# Patient Record
Sex: FEMALE | Race: WHITE | NOT HISPANIC OR LATINO | Employment: STUDENT | URBAN - METROPOLITAN AREA
[De-identification: names, ages, dates, MRNs, and addresses within clinical notes are randomized per-mention and may not be internally consistent; named-entity substitution may affect disease eponyms.]

---
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
## 2023-05-26 DIAGNOSIS — Z30.41 SURVEILLANCE FOR BIRTH CONTROL, ORAL CONTRACEPTIVES: ICD-10-CM

## 2023-05-31 NOTE — TELEPHONE ENCOUNTER
Pt has a WA scheduled for 7/28/23 and is requesting to have refills for her birth control to cover until seen   Please review for refill request

## 2023-06-01 RX ORDER — LEVONORGESTREL AND ETHINYL ESTRADIOL 0.1-0.02MG
KIT ORAL
Qty: 84 TABLET | Refills: 0 | Status: SHIPPED | OUTPATIENT
Start: 2023-06-01

## 2023-08-18 ENCOUNTER — OFFICE VISIT (OUTPATIENT)
Dept: OBGYN CLINIC | Facility: CLINIC | Age: 18
End: 2023-08-18
Payer: COMMERCIAL

## 2023-08-18 VITALS
BODY MASS INDEX: 19.53 KG/M2 | DIASTOLIC BLOOD PRESSURE: 70 MMHG | HEIGHT: 67 IN | SYSTOLIC BLOOD PRESSURE: 112 MMHG | WEIGHT: 124.4 LBS

## 2023-08-18 DIAGNOSIS — Z11.3 SCREEN FOR STD (SEXUALLY TRANSMITTED DISEASE): ICD-10-CM

## 2023-08-18 DIAGNOSIS — L40.9 PSORIASIS: ICD-10-CM

## 2023-08-18 DIAGNOSIS — N92.3 IMB (INTERMENSTRUAL BLEEDING): Primary | ICD-10-CM

## 2023-08-18 PROCEDURE — 99213 OFFICE O/P EST LOW 20 MIN: CPT | Performed by: OBSTETRICS & GYNECOLOGY

## 2023-08-18 RX ORDER — RISANKIZUMAB-RZAA 150 MG/ML
INJECTION SUBCUTANEOUS
COMMUNITY
Start: 2023-07-07

## 2023-08-18 RX ORDER — NORGESTREL-ETHINYL ESTRADIOL 0.3-0.03MG
1 TABLET ORAL DAILY
Qty: 90 TABLET | Refills: 3 | Status: SHIPPED | OUTPATIENT
Start: 2023-08-18 | End: 2023-08-25 | Stop reason: SDUPTHER

## 2023-08-18 NOTE — PROGRESS NOTES
PROBLEM GYNECOLOGICAL VISIT    Lauro Manzo is a 21 y.o. female who presents today with complaint of IMB. Her general medical history has been reviewed and she reports it as follows:    Past Medical History:   Diagnosis Date   • Anxiety    • Psoriasis      Past Surgical History:   Procedure Laterality Date   • CAUTERIZE INNER NOSE     • OTHER SURGICAL HISTORY  2020    frenectomy     OB History        0    Para   0    Term   0       0    AB   0    Living   0       SAB   0    IAB   0    Ectopic   0    Multiple   0    Live Births   0               Social History     Tobacco Use   • Smoking status: Never   • Smokeless tobacco: Never   Vaping Use   • Vaping Use: Never used   Substance Use Topics   • Alcohol use: Yes     Alcohol/week: 1.0 - 4.0 standard drink of alcohol     Types: 1 - 4 Standard drinks or equivalent per week   • Drug use: Never       Current Outpatient Medications   Medication Instructions   • levonorgestrel-ethinyl estradiol (AVIANE,ALESSE,LESSINA) 0.1-20 MG-MCG per tablet take 1 tablet by mouth once daily   • Stelara 45 MG/0.5ML SOSY subcutaneous injection No dose, route, or frequency recorded. • venlafaxine 75 mg, Oral, Daily, take with food       History of Present Illness:   + has  Had  BTB     Day  7 to 9 around for   4-5  Mo. Full bleeding.  + sexually active . No missed pills   No chg  In   Wt  + lab work w primary w  Anemia      Review of Systems:  Review of Systems   Constitutional: Negative. Gastrointestinal: Negative. Genitourinary: Positive for menstrual problem and vaginal bleeding. Neurological: Negative. Psychiatric/Behavioral: Negative. Physical Exam:  There were no vitals taken for this visit. Physical Exam  Constitutional:       Appearance: Normal appearance. Genitourinary:      Bladder, rectum and urethral meatus normal.      Right Labia: No rash, tenderness, lesions or skin changes.      Left Labia: No tenderness, lesions, skin changes or rash. No labial fusion noted. No inguinal adenopathy present in the right or left side. Pelvic Jn Score: 5/5. Vaginal cuff intact. No vaginal prolapse present. No vaginal atrophy present. Right Adnexa: not tender, not full and no mass present. Left Adnexa: not tender, not full and no mass present. Cervix is not nulliparous. No cervical motion tenderness, discharge, friability or lesion. Uterus is not enlarged, tender or prolapsed. No urethral prolapse, tenderness or discharge present. Pelvic Floor: Levator muscle strength is 4/5. Pelvic exam was performed with patient in the lithotomy position. Abdominal:      General: Abdomen is flat. Bowel sounds are normal.      Palpations: Abdomen is soft. Hernia: There is no hernia in the left inguinal area or right inguinal area. Lymphadenopathy:      Lower Body: No right inguinal adenopathy. No left inguinal adenopathy. Neurological:      Mental Status: She is alert. Vitals and nursing note reviewed. I have spent a total time of 22 minutes on 08/18/23 in caring for this patient including Diagnostic results, Instructions for management, Importance of tx compliance, Risk factor reductions, Counseling / Coordination of care, Documenting in the medical record and Reviewing / ordering tests, medicine, procedures  . Assessment:   1. IMB,  No missed pills,  Screen for  Std     Plan:    Dw pt  Iron rich diet,  gc /chlm done encouraged  Encouraged condom use. MVI   Daily  . Day one of  Cycle   cryseele one po every day   [place] btb days. Fu in spring or  Prn  Reviewed with patient that test results are available in [facility] immediately, but that they will not necessarily be reviewed by me immediately. Explained that I will review results at my earliest opportunity and contact patient appropriately.

## 2023-08-18 NOTE — PATIENT INSTRUCTIONS
Take birth control as directed. Start day one of period one tablet daily. [place]  BTB  days  as explained. Rx sent to pharmacy on file. ACHES reviewed. Aware of benefits, risks and alternatives of birth control. Exercise 150 minutes per week minimum. Always condom use for STI protection.

## 2023-08-19 LAB
C TRACH RRNA SPEC QL NAA+PROBE: DETECTED
N GONORRHOEA RRNA SPEC QL NAA+PROBE: NOT DETECTED

## 2023-08-21 ENCOUNTER — TELEPHONE (OUTPATIENT)
Dept: OBGYN CLINIC | Facility: CLINIC | Age: 18
End: 2023-08-21

## 2023-08-21 DIAGNOSIS — Z11.3 SCREEN FOR STD (SEXUALLY TRANSMITTED DISEASE): ICD-10-CM

## 2023-08-21 DIAGNOSIS — A74.9 CHLAMYDIA INFECTION: Primary | ICD-10-CM

## 2023-08-21 RX ORDER — DOXYCYCLINE 100 MG/1
100 TABLET ORAL 2 TIMES DAILY
Qty: 14 TABLET | Refills: 0 | Status: SHIPPED | OUTPATIENT
Start: 2023-08-21 | End: 2023-08-28

## 2023-08-21 NOTE — TELEPHONE ENCOUNTER
Spoke with [address]te [address] and informed pt is not pregnant & is being treated with Doxycycline 100 mg 1 pill 2x/day for a total of 7 days. Informed Amarjit Redman has not been informed of results yet. Provided Rohini with pts cell phone #..

## 2023-08-21 NOTE — TELEPHONE ENCOUNTER
Danielle, [facility] reports pt tested (+)for Chlamydia. She is requesting treatment & pregnancy status of pt. .  Message sent to LEXI Zamarripa for test results. Rohini's phone # & confidential voice mail is [phone].

## 2023-08-25 ENCOUNTER — ANNUAL EXAM (OUTPATIENT)
Dept: OBGYN CLINIC | Facility: CLINIC | Age: 18
End: 2023-08-25
Payer: COMMERCIAL

## 2023-08-25 VITALS
HEIGHT: 67 IN | BODY MASS INDEX: 19.62 KG/M2 | WEIGHT: 125 LBS | SYSTOLIC BLOOD PRESSURE: 112 MMHG | DIASTOLIC BLOOD PRESSURE: 72 MMHG

## 2023-08-25 DIAGNOSIS — L40.9 PSORIASIS: ICD-10-CM

## 2023-08-25 DIAGNOSIS — Z86.19 HISTORY OF CHLAMYDIA INFECTION: ICD-10-CM

## 2023-08-25 DIAGNOSIS — Z01.419 ENCOUNTER FOR GYNECOLOGICAL EXAMINATION WITHOUT ABNORMAL FINDING: Primary | ICD-10-CM

## 2023-08-25 DIAGNOSIS — Z11.3 SCREEN FOR STD (SEXUALLY TRANSMITTED DISEASE): ICD-10-CM

## 2023-08-25 DIAGNOSIS — Z30.41 SURVEILLANCE FOR BIRTH CONTROL, ORAL CONTRACEPTIVES: ICD-10-CM

## 2023-08-25 PROCEDURE — 99395 PREV VISIT EST AGE 18-39: CPT | Performed by: OBSTETRICS & GYNECOLOGY

## 2023-08-25 RX ORDER — NORGESTREL-ETHINYL ESTRADIOL 0.3-0.03MG
1 TABLET ORAL DAILY
Qty: 90 TABLET | Refills: 3 | Status: SHIPPED | OUTPATIENT
Start: 2023-08-25 | End: 2024-08-24

## 2023-08-25 NOTE — PROGRESS NOTES
[address] [place] [address], [address], [place], [address] [facility]    ASSESSMENT/PLAN: Omar Bui is a 21 y.o. [facility] who presents for annual gynecologic exam.    Encounter for routine gynecologic examination  - Routine well woman exam completed today. - HPV Vaccination status: Immunization series complete  - STI screening offered including HIV testing: orders  Given  counseled  On   brittany   - Contraceptive counseling discussed. Current contraception: condoms or combination OCPs:     Additional problems addressed during this visit:  1. Encounter for gynecological examination without abnormal finding    2. Psoriasis    3. Surveillance for birth control, oral contraceptives    4. Screen for STD (sexually transmitted disease)    5. History of chlamydia infection    22 yo  here for wellness exam.  + treated on  for  chlamydia . Partner was treated. Dw pt  Treat to cure  In 2023. Counseling done on STD prevention. On OCP no missed  Pills will  Monitor  IMB. Counseled on STI testing  Lab slip given. Pelvic deferred today done 2 weeks ago . No dysuria or frequency. CC:  Annual Gynecologic Examination    HPI: Omar Bui is a 21 y.o. [facility] who presents for annual gynecologic examination. 22 yo  here for wellness exam.  On  Pil w no BTB, denies ACHES  . No missed pills     + tx for chlmydia       The following portions of the patient's history were reviewed and updated as appropriate: She  has a past medical history of Anxiety and Psoriasis. She  has a past surgical history that includes Cauterize inner nose and Other surgical history (2020). Her family history includes Cancer in her maternal grandmother; Coronary artery disease in her mother; Diabetes in her father; Hyperlipidemia in her mother; Hypertension in her mother. She  reports that she has never smoked. She has never been exposed to tobacco smoke.  She has never used smokeless tobacco. She reports current alcohol use of about 1.0 - 4.0 standard drink of alcohol per week. She reports that she does not use drugs. Current Outpatient Medications   Medication Sig Dispense Refill   • doxycycline (ADOXA) 100 MG tablet Take 1 tablet (100 mg total) by mouth 2 (two) times a day for 7 days 14 tablet 0   • norgestrel-ethinyl estradiol (Cryselle-28) 0.3 mg-30 mcg per tablet Take 1 tablet by mouth daily 90 tablet 3   • Skyrizi Pen 150 MG/ML SOAJ      • venlafaxine 75 mg 24 hr tablet Take 75 mg by mouth daily take with food     • Stelara 45 MG/0.5ML SOSY subcutaneous injection  (Patient not taking: Reported on 8/18/2023)       No current facility-administered medications for this visit. She has No Known Allergies. .    Review of Systems   Constitutional: Negative for chills and fever. HENT: Negative for ear pain and sore throat. Eyes: Negative for pain and visual disturbance. Respiratory: Negative for cough and shortness of breath. Cardiovascular: Negative for chest pain and palpitations. Gastrointestinal: Negative for abdominal pain and vomiting. Genitourinary: Negative for dysuria and hematuria. Musculoskeletal: Negative for arthralgias and back pain. Skin: Negative for color change and rash. Neurological: Negative for seizures and syncope. Hematological: Negative. Psychiatric/Behavioral: Negative. All other systems reviewed and are negative. Objective:  /72 (BP Location: Left arm, Patient Position: Sitting, Cuff Size: Standard)   Ht 5' 7" (1.702 m)   Wt 56.7 kg (125 lb)   LMP 08/23/2023 (Approximate)   BMI 19.58 kg/m²    Physical Exam  Vitals and nursing note reviewed. Constitutional:       Appearance: Normal appearance. HENT:      Head: Normocephalic. Cardiovascular:      Rate and Rhythm: Normal rate and regular rhythm. Pulses: Normal pulses. Heart sounds: Normal heart sounds.    Pulmonary:      Effort: Pulmonary effort is normal.      Breath sounds: Normal breath sounds. Chest:      Chest wall: No mass, lacerations, swelling, tenderness or edema. Breasts: Jn Score is 4. Breasts are symmetrical.      Right: Normal. No swelling, bleeding, inverted nipple, mass, nipple discharge, skin change or tenderness. Left: No swelling, bleeding, inverted nipple, mass, nipple discharge, skin change or tenderness. Abdominal:      General: Abdomen is flat. Bowel sounds are normal.      Palpations: Abdomen is soft. Genitourinary:     Jn stage (genital): 4. Comments: Deferred pt w  Exam  On  8/18    Musculoskeletal:         General: Normal range of motion. Cervical back: Neck supple. Lymphadenopathy:      Upper Body:      Right upper body: No supraclavicular, axillary or pectoral adenopathy. Left upper body: No supraclavicular, axillary or pectoral adenopathy. Skin:     General: Skin is warm and dry. Neurological:      General: No focal deficit present. Mental Status: She is alert and oriented to person, place, and time. Psychiatric:         Mood and Affect: Mood normal.         Behavior: Behavior normal.         Thought Content:  Thought content normal.         Judgment: Judgment normal.

## 2023-08-25 NOTE — PATIENT INSTRUCTIONS
Pap every 3 years if normal, starting at age 24  STI testing as indicated, exercise most days of week, obtain appropriate diet and hydration, Calcium 1000mg + 600 vit D daily, birth control as directed (ACHES reviewed). Benefits, risks and alternatives discussed/reviewed. Condom use when sexually active for sexually transmitted infection prevention. HPV 9 vaccine recommended through age 39. Check with your insurance for coverage. If covered, call office to schedule start of vaccine series. Annual mammogram starting at age 36, monthly breast self exam. Kuhrram Munoz   at red light or at least  20 times a day.

## 2023-10-23 LAB
HAV IGM SERPL QL IA: NORMAL
HBV CORE IGM SERPL QL IA: NORMAL
HBV SURFACE AG SERPL QL IA: NORMAL
HCV AB SERPL QL IA: NORMAL
HIV 1+2 AB+HIV1 P24 AG SERPL QL IA: NORMAL
RPR SER QL: NORMAL

## 2023-10-24 PROBLEM — Z11.3 SCREEN FOR STD (SEXUALLY TRANSMITTED DISEASE): Status: RESOLVED | Noted: 2023-08-25 | Resolved: 2023-10-24

## 2023-10-24 PROBLEM — Z01.419 ENCOUNTER FOR GYNECOLOGICAL EXAMINATION WITHOUT ABNORMAL FINDING: Status: RESOLVED | Noted: 2023-08-25 | Resolved: 2023-10-24

## 2023-11-02 ENCOUNTER — TELEPHONE (OUTPATIENT)
Dept: OBGYN CLINIC | Facility: CLINIC | Age: 18
End: 2023-11-02

## 2023-11-02 NOTE — TELEPHONE ENCOUNTER
Pt called to request to have her RX for her birth control sent to [facility]. A one year renewal was provided at her 8/25/23 appointment and sent to [facility]. This [facility] has closed and informed all prescriptions were sent to [facility]. Pt is currently attending college in [place], [place]. Pt will contact the [facility] in [place] and request to have her prescription transferred to either [facility] or [facility] in [place], [place]. Instructed pt to call back if she needs any further assistance.

## 2023-11-07 ENCOUNTER — TELEPHONE (OUTPATIENT)
Dept: OBGYN CLINIC | Facility: CLINIC | Age: 18
End: 2023-11-07

## 2023-11-07 DIAGNOSIS — Z11.3 SCREEN FOR STD (SEXUALLY TRANSMITTED DISEASE): ICD-10-CM

## 2023-11-07 DIAGNOSIS — Z30.41 SURVEILLANCE FOR BIRTH CONTROL, ORAL CONTRACEPTIVES: Primary | ICD-10-CM

## 2023-11-07 DIAGNOSIS — L40.9 PSORIASIS: ICD-10-CM

## 2023-11-07 RX ORDER — NORGESTREL-ETHINYL ESTRADIOL 0.3-0.03MG
1 TABLET ORAL DAILY
Qty: 90 TABLET | Refills: 3 | Status: SHIPPED | OUTPATIENT
Start: 2023-11-07 | End: 2024-11-06

## 2023-11-07 NOTE — TELEPHONE ENCOUNTER
Pt returned call and informed she is having a hard time transferring her birth control prescription from [facility] to [address] [place] [address]. , the darlene for the store is currently down. Pt has an upcoming appointment on 11/21/23. Pt is requesting to have a  new 1 yr renewal sent to  [facility]

## 2023-11-21 ENCOUNTER — OFFICE VISIT (OUTPATIENT)
Dept: OBGYN CLINIC | Facility: CLINIC | Age: 18
End: 2023-11-21

## 2023-11-21 VITALS
BODY MASS INDEX: 20.4 KG/M2 | DIASTOLIC BLOOD PRESSURE: 80 MMHG | WEIGHT: 130 LBS | HEIGHT: 67 IN | SYSTOLIC BLOOD PRESSURE: 120 MMHG

## 2023-11-21 DIAGNOSIS — Z11.3 SCREEN FOR STD (SEXUALLY TRANSMITTED DISEASE): ICD-10-CM

## 2023-11-21 DIAGNOSIS — Z86.19 HISTORY OF CHLAMYDIA INFECTION: ICD-10-CM

## 2023-11-21 DIAGNOSIS — Z30.41 SURVEILLANCE FOR BIRTH CONTROL, ORAL CONTRACEPTIVES: Primary | ICD-10-CM

## 2023-11-21 DIAGNOSIS — L40.9 PSORIASIS: ICD-10-CM

## 2023-11-21 RX ORDER — NORGESTREL-ETHINYL ESTRADIOL 0.3-0.03MG
1 TABLET ORAL DAILY
Qty: 90 TABLET | Refills: 3 | Status: SHIPPED | OUTPATIENT
Start: 2023-11-21 | End: 2024-11-20

## 2023-11-21 NOTE — PROGRESS NOTES
PROBLEM GYNECOLOGICAL VISIT    Shai Sapp is a 19 y.o. female who presents today with complaint of   needing a treat to cure for  chlamydia  treated  2023. Her general medical history has been reviewed and she reports it as follows:    Past Medical History:   Diagnosis Date    Anxiety     Psoriasis      Past Surgical History:   Procedure Laterality Date    CAUTERIZE INNER NOSE      OTHER SURGICAL HISTORY  2020    frenectomy     OB History          0    Para   0    Term   0       0    AB   0    Living   0         SAB   0    IAB   0    Ectopic   0    Multiple   0    Live Births   0               Social History     Tobacco Use    Smoking status: Never     Passive exposure: Never    Smokeless tobacco: Never   Vaping Use    Vaping Use: Never used   Substance Use Topics    Alcohol use: Yes     Alcohol/week: 1.0 - 4.0 standard drink of alcohol     Types: 1 - 4 Standard drinks or equivalent per week     Comment: social    Drug use: Never       Current Outpatient Medications   Medication Instructions    norgestrel-ethinyl estradiol (Cryselle-28) 0.3 mg-30 mcg per tablet 1 tablet, Oral, Daily    Skyrizi Pen 150 MG/ML SOAJ No dose, route, or frequency recorded. Stelara 45 MG/0.5ML SOSY subcutaneous injection No dose, route, or frequency recorded. venlafaxine 75 mg, Oral, Daily, take with food       History of Present Illness:   Pt w hx of  chlamydia  treated in 2023  Partner was treated. On OCP  denies  ACHES and BTB  + condom use. No abn  DC  labs were drawn for STI    Review of Systems:  Review of Systems   Gastrointestinal: Negative. Genitourinary: Negative. Neurological: Negative. Hematological: Negative. Psychiatric/Behavioral: Negative. All other systems reviewed and are negative.     Physical Exam:  /80 (BP Location: Left arm, Patient Position: Sitting, Cuff Size: Standard)   Ht 5' 7" (1.702 m)   Wt 59 kg (130 lb)   LMP 2023   BMI 20.36 kg/m² Physical Exam  Constitutional:       Appearance: She is normal weight. Genitourinary:      Vulva, bladder, rectum and urethral meatus normal.      No lesions in the vagina. Right Labia: No rash, tenderness, lesions or skin changes. Left Labia: No tenderness, lesions, skin changes or rash. No inguinal adenopathy present in the right or left side. Pelvic Jn Score: 4/5. No vaginal discharge, erythema, tenderness, bleeding, ulceration or granulation tissue. No vaginal prolapse present. No vaginal atrophy present. Right Adnexa: not tender, not full and no mass present. Left Adnexa: not tender, not full and no mass present. Cervix is nulliparous. No cervical motion tenderness or friability. Uterus is not enlarged or tender. No urethral prolapse, tenderness, mass or hypermobility present. Pelvic Floor: Levator muscle strength is 4/5. Pelvic floor neuro is intact. Pelvic exam was performed with patient in the lithotomy position. Abdominal:      Palpations: Abdomen is soft. Hernia: There is no hernia in the left inguinal area or right inguinal area. Lymphadenopathy:      Lower Body: No right inguinal adenopathy. No left inguinal adenopathy. Neurological:      Mental Status: She is alert. Vitals and nursing note reviewed. Assessment:   1. hx of  chlam    screening for std   contraceptive pill surv    Plan:GC chlam done , reviewed labs   for sti and all negative. Reviewed ACHES and  Btb   fu in   8/2023 or prn      Reviewed with patient that [facility] immediately, but that they will not necessarily be reviewed by me immediately. Explained that I will review results at my earliest opportunity and contact patient appropriately.

## 2023-11-21 NOTE — PATIENT INSTRUCTIONS
Continue to  use condoms. We  did a treat to cure   today. If you have any increase in discharge, itching or burning notify the office.

## 2023-11-22 LAB
C TRACH RRNA SPEC QL NAA+PROBE: NOT DETECTED
N GONORRHOEA RRNA SPEC QL NAA+PROBE: NOT DETECTED

## 2024-10-11 DIAGNOSIS — Z11.3 SCREEN FOR STD (SEXUALLY TRANSMITTED DISEASE): ICD-10-CM

## 2024-10-11 DIAGNOSIS — L40.9 PSORIASIS: ICD-10-CM

## 2024-10-11 RX ORDER — NORGESTREL AND ETHINYL ESTRADIOL 0.3-0.03MG
1 KIT ORAL DAILY
Qty: 84 TABLET | Refills: 0 | Status: SHIPPED | OUTPATIENT
Start: 2024-10-11

## 2025-01-03 DIAGNOSIS — Z11.3 SCREEN FOR STD (SEXUALLY TRANSMITTED DISEASE): ICD-10-CM

## 2025-01-03 DIAGNOSIS — L40.9 PSORIASIS: ICD-10-CM

## 2025-01-06 DIAGNOSIS — L40.9 PSORIASIS: ICD-10-CM

## 2025-01-06 DIAGNOSIS — Z11.3 SCREEN FOR STD (SEXUALLY TRANSMITTED DISEASE): ICD-10-CM

## 2025-01-06 PROBLEM — Z12.4 SCREENING FOR CERVICAL CANCER: Status: ACTIVE | Noted: 2025-01-06

## 2025-01-06 PROBLEM — Z01.419 ENCOUNTER FOR GYNECOLOGICAL EXAMINATION WITHOUT ABNORMAL FINDING: Status: ACTIVE | Noted: 2025-01-06

## 2025-01-06 RX ORDER — NORGESTREL AND ETHINYL ESTRADIOL 0.3-0.03MG
1 KIT ORAL DAILY
Qty: 84 TABLET | Refills: 0 | Status: SHIPPED | OUTPATIENT
Start: 2025-01-06 | End: 2025-01-06 | Stop reason: SDUPTHER

## 2025-01-06 NOTE — TELEPHONE ENCOUNTER
Pt set up yearly appt.     Pt request refill of medication.    Medication: norgestrel-ethinyl estradiol (Low-Ogestrel) 0.3 mg-30 mcg per tablet [     Dose/Frequency: TAKE 1 TABLET BY MOUTH ONCE DAILY     Quantity: : 84 tablet     Pharmacy: [facility]/pharmacy #[id number] [place] [id number]  Phone: [phone]  Fax: [phone]  SAKSHI #: [id number]        Office:   [] PCP/Provider -   [x] Speciality/Provider -     Does the patient have enough for 3 days?   [] Yes   [x] No - Send as HP to POD

## 2025-01-06 NOTE — PROGRESS NOTES
[facility] - [address], [address], [place] [address]    ASSESSMENT/PLAN: Tory Jovel is a 19 y.o.  who presents for annual gynecologic exam.    Encounter for routine gynecologic examination  - Routine well woman exam completed today.  - HPV Vaccination status: Immunization series complete  - STI screening offered including HIV testing: Declined  - Contraceptive counseling discussed.  Current contraception: condoms or combination OCPs:     Additional problems addressed during this visit:  1. Encounter for gynecological examination without abnormal finding  2. Psoriasis  -     norgestrel-ethinyl estradiol (Low-Ogestrel) 0.3 mg-30 mcg per tablet; Take 1 tablet by mouth daily  3. Screen for STD (sexually transmitted disease)  -     norgestrel-ethinyl estradiol (Low-Ogestrel) 0.3 mg-30 mcg per tablet; Take 1 tablet by mouth daily  -     Thinprep Tis Pap Reflex HPV mRNA E6/E7, Chlamydia/N.gonorrhoeae  4. Screening for cervical cancer  -     Thinprep Tis Pap Reflex HPV mRNA E6/E7, Chlamydia/N.gonorrhoeae  5. History of chlamydia infection  Comments:  treated  6. Surveillance for birth control, oral contraceptives  -     norgestrel-ethinyl estradiol (Low-Ogestrel) 0.3 mg-30 mcg per tablet; Take 1 tablet by mouth daily      CC:  Annual Gynecologic Examination    HPI: Tory Jovel is a 19 y.o.  who presents for annual gynecologic examination.  20 yo   here for wellness  exam.  On ocp  some missed pills   no btb.  Same partner .        The following portions of the patient's history were reviewed and updated as appropriate: She  has a past medical history of Anxiety and Psoriasis.  She  has a past surgical history that includes Cauterize inner nose and Other surgical history (2020).  Her family history includes Cancer in her maternal grandmother; Coronary artery disease in her mother; Diabetes in her father; Hyperlipidemia in her mother; Hypertension in her  "mother.  She  reports that she has never smoked. She has never been exposed to tobacco smoke. She has never used smokeless tobacco. She reports current alcohol use of about 1.0 - 4.0 standard drink of alcohol per week. She reports that she does not use drugs.  Current Outpatient Medications   Medication Sig Dispense Refill   • norgestrel-ethinyl estradiol (Low-Ogestrel) 0.3 mg-30 mcg per tablet Take 1 tablet by mouth daily 84 tablet 3   • norgestrel-ethinyl estradiol (Low-Ogestrel) 0.3 mg-30 mcg per tablet Take 1 tablet by mouth daily 28 tablet 0   • Skyrizi Pen 150 MG/ML SOAJ      • venlafaxine 75 mg 24 hr tablet Take 75 mg by mouth daily take with food     • Stelara 45 MG/0.5ML SOSY subcutaneous injection  (Patient not taking: Reported on 1/7/2025)       No current facility-administered medications for this visit.     She has no known allergies..    Review of Systems   Constitutional:  Negative for chills and fever.   HENT:  Negative for ear pain and sore throat.    Eyes:  Negative for pain and visual disturbance.   Respiratory:  Negative for cough and shortness of breath.    Cardiovascular:  Negative for chest pain and palpitations.   Gastrointestinal:  Negative for abdominal pain and vomiting.   Endocrine: Negative.    Genitourinary: Negative.  Negative for dysuria and hematuria.   Musculoskeletal:  Negative for arthralgias and back pain.   Skin:  Negative for color change and rash.   Allergic/Immunologic: Negative.    Neurological: Negative.  Negative for seizures and syncope.   Hematological: Negative.    Psychiatric/Behavioral: Negative.     All other systems reviewed and are negative.        Objective:  /62   Ht 5' 7\" (1.702 m)   Wt 63.5 kg (140 lb)   LMP 12/31/2024 (Approximate)   BMI 21.93 kg/m²    Physical Exam  Vitals and nursing note reviewed.   Constitutional:       Appearance: Normal appearance.   HENT:      Head: Normocephalic.   Cardiovascular:      Rate and Rhythm: Normal rate and regular " rhythm.      Pulses: Normal pulses.      Heart sounds: Normal heart sounds.   Pulmonary:      Effort: Pulmonary effort is normal.      Breath sounds: Normal breath sounds.   Chest:      Chest wall: No mass, lacerations, swelling, tenderness or edema.   Breasts:     Jn Score is 4.      Breasts are symmetrical.      Right: Normal. No swelling, bleeding, inverted nipple, mass, nipple discharge, skin change or tenderness.      Left: No swelling, bleeding, inverted nipple, mass, nipple discharge, skin change or tenderness.   Abdominal:      General: Abdomen is flat. Bowel sounds are normal.      Palpations: Abdomen is soft.   Genitourinary:     General: Normal vulva.      Exam position: Lithotomy position.      Pubic Area: No rash.       Jn stage (genital): 4.      Labia:         Right: No rash, tenderness or lesion.         Left: No rash, tenderness or lesion.       Urethra: No urethral pain, urethral swelling or urethral lesion.      Vagina: Normal.      Cervix: No cervical motion tenderness or discharge.      Uterus: Normal.       Adnexa: Right adnexa normal and left adnexa normal.      Rectum: Normal.          Comments: 32 cm   pink  scaly    ( psoriasis patch )  will be o Sclera  this week  and will clear    may use Aquaphor   Musculoskeletal:         General: Normal range of motion.      Cervical back: Normal range of motion and neck supple.   Lymphadenopathy:      Upper Body:      Right upper body: No supraclavicular, axillary or pectoral adenopathy.      Left upper body: No supraclavicular, axillary or pectoral adenopathy.      Lower Body: No right inguinal adenopathy. No left inguinal adenopathy.   Skin:     General: Skin is warm and dry.   Neurological:      General: No focal deficit present.      Mental Status: She is alert and oriented to person, place, and time.   Psychiatric:         Mood and Affect: Mood normal.         Behavior: Behavior normal.         Thought Content: Thought content normal.          Judgment: Judgment normal.

## 2025-01-07 ENCOUNTER — ANNUAL EXAM (OUTPATIENT)
Dept: OBGYN CLINIC | Facility: CLINIC | Age: 20
End: 2025-01-07
Payer: COMMERCIAL

## 2025-01-07 VITALS
HEIGHT: 67 IN | BODY MASS INDEX: 21.97 KG/M2 | WEIGHT: 140 LBS | DIASTOLIC BLOOD PRESSURE: 62 MMHG | SYSTOLIC BLOOD PRESSURE: 108 MMHG

## 2025-01-07 DIAGNOSIS — Z12.4 SCREENING FOR CERVICAL CANCER: ICD-10-CM

## 2025-01-07 DIAGNOSIS — Z01.419 ENCOUNTER FOR GYNECOLOGICAL EXAMINATION WITHOUT ABNORMAL FINDING: Primary | ICD-10-CM

## 2025-01-07 DIAGNOSIS — L40.9 PSORIASIS: ICD-10-CM

## 2025-01-07 DIAGNOSIS — Z86.19 HISTORY OF CHLAMYDIA INFECTION: ICD-10-CM

## 2025-01-07 DIAGNOSIS — Z30.41 SURVEILLANCE FOR BIRTH CONTROL, ORAL CONTRACEPTIVES: ICD-10-CM

## 2025-01-07 DIAGNOSIS — Z11.3 SCREEN FOR STD (SEXUALLY TRANSMITTED DISEASE): ICD-10-CM

## 2025-01-07 PROCEDURE — 99395 PREV VISIT EST AGE 18-39: CPT | Performed by: OBSTETRICS & GYNECOLOGY

## 2025-01-07 RX ORDER — NORGESTREL AND ETHINYL ESTRADIOL 0.3-0.03MG
1 KIT ORAL DAILY
Qty: 28 TABLET | Refills: 0 | Status: SHIPPED | OUTPATIENT
Start: 2025-01-07

## 2025-01-07 RX ORDER — NORGESTREL AND ETHINYL ESTRADIOL 0.3-0.03MG
1 KIT ORAL DAILY
Qty: 84 TABLET | Refills: 3 | Status: SHIPPED | OUTPATIENT
Start: 2025-01-07

## 2025-01-10 LAB
C TRACH RRNA SPEC QL NAA+PROBE: NOT DETECTED
CLINICAL INFO: NORMAL
CYTO CVX: NORMAL
CYTOLOGY CMNT CVX/VAG CYTO-IMP: NORMAL
DATE PREVIOUS BX: NORMAL
LMP START DATE: NORMAL
N GONORRHOEA RRNA SPEC QL NAA+PROBE: NOT DETECTED
SL AMB PREV. PAP:: NORMAL
SPECIMEN SOURCE CVX/VAG CYTO: NORMAL

## 2025-01-31 DIAGNOSIS — Z11.3 SCREEN FOR STD (SEXUALLY TRANSMITTED DISEASE): ICD-10-CM

## 2025-01-31 DIAGNOSIS — L40.9 PSORIASIS: ICD-10-CM

## 2025-01-31 RX ORDER — NORGESTREL AND ETHINYL ESTRADIOL 0.3-0.03MG
1 KIT ORAL DAILY
Qty: 84 TABLET | Refills: 3 | OUTPATIENT
Start: 2025-01-31

## 2025-01-31 NOTE — TELEPHONE ENCOUNTER
Patient needs new prescription sent to [facility]. There was a  mix up and prescription was discontinued at pharmacy not a duplicate       Reason for call:   [x] Refill   [] Prior Auth  [] Other:     Office:   [] PCP/Provider -   [x] Specialty/Provider - Saranya Gonsales    Medication: Low Ogestrel    Dose/Frequency: 0.3mg- 30 mcg Daily     Quantity: 84    Pharmacy: [facility] [place]     Does the patient have enough for 3 days?   [] Yes   [x] No - Send as HP to POD

## 2025-02-04 DIAGNOSIS — Z11.3 SCREEN FOR STD (SEXUALLY TRANSMITTED DISEASE): ICD-10-CM

## 2025-02-04 DIAGNOSIS — L40.9 PSORIASIS: ICD-10-CM

## 2025-02-04 RX ORDER — NORGESTREL AND ETHINYL ESTRADIOL 0.3-0.03MG
1 KIT ORAL DAILY
Qty: 84 TABLET | Refills: 3 | Status: SHIPPED | OUTPATIENT
Start: 2025-02-04 | End: 2025-02-05 | Stop reason: SDUPTHER

## 2025-02-04 NOTE — TELEPHONE ENCOUNTER
Johnathan Beaver,    This patient just reached out regarding her refill. She is out of medication. The pharmacy does not have the script for her as there was some mix up at the pharmacy and the script was discontinued in their system so she cannot get without having it resent. Can you take a look at your earliest convenience? She is hoping to get this today. Much appreciated.

## 2025-02-05 DIAGNOSIS — Z11.3 SCREEN FOR STD (SEXUALLY TRANSMITTED DISEASE): ICD-10-CM

## 2025-02-05 DIAGNOSIS — L40.9 PSORIASIS: ICD-10-CM

## 2025-02-05 PROBLEM — Z01.419 ENCOUNTER FOR GYNECOLOGICAL EXAMINATION WITHOUT ABNORMAL FINDING: Status: RESOLVED | Noted: 2025-01-06 | Resolved: 2025-02-05

## 2025-02-05 PROBLEM — Z12.4 SCREENING FOR CERVICAL CANCER: Status: RESOLVED | Noted: 2025-01-06 | Resolved: 2025-02-05

## 2025-02-05 RX ORDER — NORGESTREL AND ETHINYL ESTRADIOL 0.3-0.03MG
1 KIT ORAL DAILY
Qty: 84 TABLET | Refills: 3 | Status: SHIPPED | OUTPATIENT
Start: 2025-02-05

## 2025-02-05 NOTE — TELEPHONE ENCOUNTER
Script was sent to wrong pharmacy    Medication: norgestrel-ethinyl estradiol (Low-Ogestrel) 0.3 mg-30 mcg per tablet     Dose/Frequency:  Take 1 tablet by mouth daily     Quantity: 84 tablet     Pharmacy: [facility] #[id number] - [place] - [address] [phone]     Office:   [] PCP/Provider -   [x] Speciality/Provider -     Does the patient have enough for 3 days?   [] Yes   [x] No - Send as HP to POD

## 2025-02-06 PROBLEM — Z11.3 SCREEN FOR STD (SEXUALLY TRANSMITTED DISEASE): Status: RESOLVED | Noted: 2025-01-07 | Resolved: 2025-02-06

## 2025-02-10 RX ORDER — NORGESTREL AND ETHINYL ESTRADIOL 0.3-0.03MG
1 KIT ORAL DAILY
Qty: 84 TABLET | Refills: 0 | OUTPATIENT
Start: 2025-02-10

## 2025-03-28 ENCOUNTER — OFFICE VISIT (OUTPATIENT)
Dept: OBGYN CLINIC | Facility: CLINIC | Age: 20
End: 2025-03-28
Payer: COMMERCIAL

## 2025-03-28 VITALS
DIASTOLIC BLOOD PRESSURE: 62 MMHG | WEIGHT: 138.2 LBS | HEIGHT: 67 IN | SYSTOLIC BLOOD PRESSURE: 112 MMHG | BODY MASS INDEX: 21.69 KG/M2

## 2025-03-28 DIAGNOSIS — Z32.02 NEGATIVE PREGNANCY TEST: ICD-10-CM

## 2025-03-28 DIAGNOSIS — Z11.3 SCREENING EXAMINATION FOR VENEREAL DISEASE: ICD-10-CM

## 2025-03-28 DIAGNOSIS — N89.8 VAGINAL DISCHARGE: ICD-10-CM

## 2025-03-28 DIAGNOSIS — N92.6 IRREGULAR BLEEDING: Primary | ICD-10-CM

## 2025-03-28 LAB
CLUE CELLS SPEC QL WET PREP: NORMAL
PH SMN: 4 [PH]
SL AMB POCT URINE HCG: NORMAL
SL AMB POCT WET MOUNT: NORMAL
T VAGINALIS VAG QL WET PREP: NORMAL
YEAST VAG QL WET PREP: NORMAL

## 2025-03-28 PROCEDURE — 99213 OFFICE O/P EST LOW 20 MIN: CPT | Performed by: PHYSICIAN ASSISTANT

## 2025-03-28 PROCEDURE — 81025 URINE PREGNANCY TEST: CPT | Performed by: PHYSICIAN ASSISTANT

## 2025-03-28 PROCEDURE — 87210 SMEAR WET MOUNT SALINE/INK: CPT | Performed by: PHYSICIAN ASSISTANT

## 2025-03-28 NOTE — PROGRESS NOTES
[facility] [address], [address], [place] [address]    ASSESSMENT/PLAN:     1. Irregular bleeding  Assessment & Plan:  UPT negative in office. Reviewed irregular bleeding likely secondary to taking Plan B and being off OCP.   STD cultures as well as vaginitis panel done today. Office will reach out with results and appropriate follow up.   Now that she is back on OCP recommend continuing to monitor and call office if irregular bleeding continues.   2. Negative pregnancy test  -     POCT urine HCG  3. Vaginal discharge  -     SURESWAB(R) ADVANCED VAGINITIS PLUS, TMA  -     POCT wet mount  4. Screening examination for venereal disease  -     SURESWAB(R) ADVANCED VAGINITIS PLUS, TMA      CC:  irregular bleeding.     HPI: Tory Jovel is a 19 y.o.  who presents for irregular bleeding. Currently on Low-Ogestrel OCP.     2 months ago ran out of pill, took Plan B the week of 2025.   Has not taken a pregnancy test.     Spotting started about week after taken the Plan B. Some days will be heavier other days just spotting. This past week was placebo week and did get a menses. Bleeding stopped today.   No new partners. Hx of chlamydia, that was the last time she had irregular bleeding.     Reports vaginal discharge, odor (fishy), itching. Denies bowel or bladder issues.   Denies pelvic pain currently, when spotting started was having some cramping.   Denies fever, chills.     ROS: Negative except as noted in HPI    Patient's last menstrual period was 2025.       She  reports being sexually active and has had partner(s) who are male. She reports using the following methods of birth control/protection: Condom Male and OCP.       The following portions of the patient's history were reviewed and updated as appropriate:   Past Medical History:   Diagnosis Date    Anxiety     Psoriasis      Past Surgical History:   Procedure Laterality Date    CAUTERIZE INNER NOSE      OTHER  "SURGICAL HISTORY  07/2020    frenectomy     Family History   Problem Relation Age of Onset    Coronary artery disease Mother     Hypertension Mother     Hyperlipidemia Mother     Diabetes Father     Cancer Maternal Grandmother         Bladder    Breast cancer Neg Hx     Ovarian cancer Neg Hx     Colon cancer Neg Hx      Social History     Socioeconomic History    Marital status: Single     Spouse name: Not on file    Number of children: Not on file    Years of education: Not on file    Highest education level: Not on file   Occupational History    Not on file   Tobacco Use    Smoking status: Never     Passive exposure: Never    Smokeless tobacco: Never   Vaping Use    Vaping status: Never Used   Substance and Sexual Activity    Alcohol use: Yes     Alcohol/week: 1.0 - 4.0 standard drink of alcohol     Types: 1 - 4 Standard drinks or equivalent per week     Comment: social    Drug use: Never    Sexual activity: Yes     Partners: Male     Birth control/protection: Condom Male, OCP     Comment: no new partners   Other Topics Concern    Not on file   Social History Narrative    Exercise: Never    Domestic violence: No    Current illegal drug use Yes         Social Drivers of Health     Financial Resource Strain: Not on file   Food Insecurity: Not on file   Transportation Needs: Not on file   Physical Activity: Not on file   Stress: Not on file   Social Connections: Not on file   Intimate Partner Violence: Not on file   Housing Stability: Not on file     Outpatient Medications Marked as Taking for the 3/28/25 encounter (Office Visit) with Tg Arndt PA-C   Medication    norgestrel-ethinyl estradiol (Low-Ogestrel) 0.3 mg-30 mcg per tablet    Skyrizi Pen 150 MG/ML SOAJ    venlafaxine 75 mg 24 hr tablet     No Known Allergies        Objective:  /62 (BP Location: Left arm, Patient Position: Sitting, Cuff Size: Standard)   Ht 5' 7\" (1.702 m)   Wt 62.7 kg (138 lb 3.2 oz)   LMP 02/28/2025   BMI 21.65 kg/m²    "     Chaperone present? Yes: Renetta Holley Student.    Physical Exam  Constitutional:       Appearance: Normal appearance. She is well-developed.   Genitourinary:      Vulva and bladder normal.      No lesions in the vagina.      Right Labia: No rash, tenderness, lesions or skin changes.     Left Labia: No tenderness, lesions, skin changes or rash.     No labial fusion noted.      No inguinal adenopathy present in the right or left side.     No vaginal discharge, erythema, tenderness or bleeding.        Right Adnexa: not tender, not full and no mass present.     Left Adnexa: not tender, not full and no mass present.     No cervical motion tenderness, discharge or lesion.      Uterus is not enlarged, tender or irregular.      No uterine mass detected.     No urethral prolapse, tenderness or mass present.      Bladder is not tender.    HENT:      Head: Normocephalic and atraumatic.   Neck:      Thyroid: No thyromegaly.   Cardiovascular:      Rate and Rhythm: Normal rate and regular rhythm.      Heart sounds: Normal heart sounds. No murmur heard.     No friction rub. No gallop.   Pulmonary:      Effort: Pulmonary effort is normal. No respiratory distress.      Breath sounds: Normal breath sounds. No wheezing.   Abdominal:      General: There is no distension.      Palpations: Abdomen is soft. There is no mass.      Tenderness: There is no abdominal tenderness. There is no guarding or rebound.      Hernia: No hernia is present.   Lymphadenopathy:      Cervical: No cervical adenopathy.      Upper Body:      Right upper body: No pectoral adenopathy.      Left upper body: No pectoral adenopathy.      Lower Body: No right inguinal adenopathy. No left inguinal adenopathy.   Neurological:      Mental Status: She is alert and oriented to person, place, and time.   Skin:     General: Skin is warm and dry.   Psychiatric:         Behavior: Behavior normal.       Results for orders placed or performed in visit on 03/28/25   POCT  urine HCG   Result Value Ref Range    URINE HCG Neg    POCT wet mount   Result Value Ref Range    WET MOUNT neg     Yeast, Wet Prep neg     pH 4     Clue Cells neg     Trich, Wet Prep neg              Tg Arndt PA-C  3/28/2025 5:36 PM

## 2025-03-28 NOTE — ASSESSMENT & PLAN NOTE
UPT negative in office. Reviewed irregular bleeding likely secondary to taking Plan B and being off OCP.   STD cultures as well as vaginitis panel done today. Office will reach out with results and appropriate follow up.   Now that she is back on OCP recommend continuing to monitor and call office if irregular bleeding continues.

## 2025-03-29 LAB
BV BACTERIA RRNA VAG QL NAA+PROBE: POSITIVE
C GLABRATA RNA VAG QL NAA+PROBE: NOT DETECTED
C TRACH RRNA SPEC QL NAA+PROBE: NOT DETECTED
CANDIDA RRNA VAG QL PROBE: DETECTED
N GONORRHOEA RRNA SPEC QL NAA+PROBE: NOT DETECTED
T VAGINALIS RRNA SPEC QL NAA+PROBE: NOT DETECTED

## 2025-03-31 ENCOUNTER — RESULTS FOLLOW-UP (OUTPATIENT)
Dept: OBGYN CLINIC | Facility: CLINIC | Age: 20
End: 2025-03-31

## 2025-03-31 DIAGNOSIS — N76.0 BACTERIAL VAGINOSIS: ICD-10-CM

## 2025-03-31 DIAGNOSIS — B37.31 YEAST VAGINITIS: Primary | ICD-10-CM

## 2025-03-31 DIAGNOSIS — B96.89 BACTERIAL VAGINOSIS: ICD-10-CM

## 2025-03-31 RX ORDER — FLUCONAZOLE 150 MG/1
150 TABLET ORAL EVERY OTHER DAY
Qty: 2 TABLET | Refills: 0 | Status: SHIPPED | OUTPATIENT
Start: 2025-03-31 | End: 2025-03-31 | Stop reason: SDUPTHER

## 2025-03-31 RX ORDER — METRONIDAZOLE 500 MG/1
500 TABLET ORAL EVERY 12 HOURS SCHEDULED
Qty: 14 TABLET | Refills: 0 | Status: SHIPPED | OUTPATIENT
Start: 2025-03-31 | End: 2025-03-31 | Stop reason: SDUPTHER

## 2025-03-31 RX ORDER — FLUCONAZOLE 150 MG/1
150 TABLET ORAL EVERY OTHER DAY
Qty: 2 TABLET | Refills: 0 | Status: SHIPPED | OUTPATIENT
Start: 2025-03-31 | End: 2025-04-03

## 2025-03-31 RX ORDER — METRONIDAZOLE 500 MG/1
500 TABLET ORAL EVERY 12 HOURS SCHEDULED
Qty: 14 TABLET | Refills: 0 | Status: SHIPPED | OUTPATIENT
Start: 2025-03-31 | End: 2025-04-07

## 2025-03-31 NOTE — TELEPHONE ENCOUNTER
Patient called back, we reviewed providers note and recommendations for follow up. Patient verbalzied understanding of all information and she asked for scripts to be changed to [facility].   Inbasket msg sent to provider for pharmacy changes.